# Patient Record
Sex: FEMALE | Race: OTHER | HISPANIC OR LATINO | URBAN - METROPOLITAN AREA
[De-identification: names, ages, dates, MRNs, and addresses within clinical notes are randomized per-mention and may not be internally consistent; named-entity substitution may affect disease eponyms.]

---

## 2017-01-23 ENCOUNTER — EMERGENCY (EMERGENCY)
Facility: HOSPITAL | Age: 47
LOS: 1 days | Discharge: HOME | End: 2017-01-23
Attending: EMERGENCY MEDICINE
Payer: MEDICAID

## 2017-01-23 VITALS
HEART RATE: 76 BPM | DIASTOLIC BLOOD PRESSURE: 77 MMHG | SYSTOLIC BLOOD PRESSURE: 152 MMHG | OXYGEN SATURATION: 100 % | RESPIRATION RATE: 18 BRPM | WEIGHT: 130.07 LBS | HEIGHT: 61 IN | TEMPERATURE: 98 F

## 2017-01-23 DIAGNOSIS — Z98.890 OTHER SPECIFIED POSTPROCEDURAL STATES: ICD-10-CM

## 2017-01-23 DIAGNOSIS — R42 DIZZINESS AND GIDDINESS: ICD-10-CM

## 2017-01-23 DIAGNOSIS — E78.00 PURE HYPERCHOLESTEROLEMIA, UNSPECIFIED: ICD-10-CM

## 2017-01-23 DIAGNOSIS — E05.90 THYROTOXICOSIS, UNSPECIFIED WITHOUT THYROTOXIC CRISIS OR STORM: ICD-10-CM

## 2017-01-23 DIAGNOSIS — Z88.0 ALLERGY STATUS TO PENICILLIN: ICD-10-CM

## 2017-01-23 PROCEDURE — 93010 ELECTROCARDIOGRAM REPORT: CPT

## 2017-01-23 PROCEDURE — 71020: CPT | Mod: 26

## 2017-01-23 PROCEDURE — 99285 EMERGENCY DEPT VISIT HI MDM: CPT

## 2017-01-23 RX ORDER — SODIUM CHLORIDE 9 MG/ML
1000 INJECTION INTRAMUSCULAR; INTRAVENOUS; SUBCUTANEOUS ONCE
Qty: 0 | Refills: 0 | Status: COMPLETED | OUTPATIENT
Start: 2017-01-23 | End: 2017-01-23

## 2017-01-23 RX ORDER — ACETAMINOPHEN 500 MG
975 TABLET ORAL ONCE
Qty: 0 | Refills: 0 | Status: COMPLETED | OUTPATIENT
Start: 2017-01-23 | End: 2017-01-24

## 2017-01-23 RX ORDER — MECLIZINE HCL 12.5 MG
25 TABLET ORAL ONCE
Qty: 0 | Refills: 0 | Status: COMPLETED | OUTPATIENT
Start: 2017-01-23 | End: 2017-01-23

## 2017-01-23 RX ORDER — SODIUM CHLORIDE 9 MG/ML
1000 INJECTION INTRAMUSCULAR; INTRAVENOUS; SUBCUTANEOUS
Qty: 0 | Refills: 0 | Status: DISCONTINUED | OUTPATIENT
Start: 2017-01-23 | End: 2017-01-27

## 2017-01-23 RX ADMIN — SODIUM CHLORIDE 1000 MILLILITER(S): 9 INJECTION INTRAMUSCULAR; INTRAVENOUS; SUBCUTANEOUS at 23:54

## 2017-01-23 NOTE — ED PROVIDER NOTE - NS ED MD SCRIBE ATTENDING SCRIBE SECTIONS
PAST MEDICAL/SURGICAL/SOCIAL HISTORY/REVIEW OF SYSTEMS/DISPOSITION/PHYSICAL EXAM/HIV/HISTORY OF PRESENT ILLNESS/VITAL SIGNS( Pullset)

## 2017-01-23 NOTE — ED PROVIDER NOTE - OBJECTIVE STATEMENT
A 46 year old female pt presents to the ED c/o dizziness. The pt had a sudden onset of diaphoresis, weakness, headache, dizziness, and palpitations s/p standing up from supine position around 8 PM. Pt also reports numbness and tingling in her extremities. She has a hx of HTN and a heart murmur. Pt states that she was experiencing right ear pain and ringing 1 week and was given OTC ear medication. Her PMD is Dr. Salgado. No further complaints at this time.

## 2017-01-23 NOTE — ED ADULT TRIAGE NOTE - CHIEF COMPLAINT QUOTE
pt reports "I was home, I became sweaty, my heart was racing, then I was cold again." at present time reports dizziness and legs shaky.

## 2017-01-23 NOTE — ED PROVIDER NOTE - PROGRESS NOTE DETAILS
symptoms improved.  stable for discharge with meclizine .  h&p suggest vertigo.  informed about tft's.   d/c home

## 2017-01-23 NOTE — ED STATDOCS - PROGRESS NOTE DETAILS
This is a 46 year old female with hx of HTN presenting to the ED complaining of a sudden onset of dizziness, SOB, palpitations, and diaphoresis prior to arrival. She denies abdominal pain, nausea, vomiting, and headaches. Pt is compliant with enalapril. PSHx includes c-sections x 2. LMP 12/23. She denies previous episodes. Allergy to penicillin. Focused exam performed, protocol orders entered, pt to be placed in main ED for complete evaluation by another provider.

## 2017-01-24 VITALS
OXYGEN SATURATION: 100 % | RESPIRATION RATE: 18 BRPM | HEART RATE: 88 BPM | SYSTOLIC BLOOD PRESSURE: 124 MMHG | TEMPERATURE: 98 F | DIASTOLIC BLOOD PRESSURE: 66 MMHG

## 2017-01-24 LAB
ALBUMIN SERPL ELPH-MCNC: 4.3 G/DL — SIGNIFICANT CHANGE UP (ref 3.3–5.2)
ALP SERPL-CCNC: 49 U/L — SIGNIFICANT CHANGE UP (ref 40–120)
ALT FLD-CCNC: 13 U/L — SIGNIFICANT CHANGE UP
ANION GAP SERPL CALC-SCNC: 12 MMOL/L — SIGNIFICANT CHANGE UP (ref 5–17)
APPEARANCE UR: CLEAR — SIGNIFICANT CHANGE UP
AST SERPL-CCNC: 16 U/L — SIGNIFICANT CHANGE UP
BACTERIA # UR AUTO: ABNORMAL
BASOPHILS # BLD AUTO: 0 K/UL — SIGNIFICANT CHANGE UP (ref 0–0.2)
BASOPHILS NFR BLD AUTO: 0.1 % — SIGNIFICANT CHANGE UP (ref 0–2)
BILIRUB SERPL-MCNC: 0.2 MG/DL — LOW (ref 0.4–2)
BILIRUB UR-MCNC: NEGATIVE — SIGNIFICANT CHANGE UP
BUN SERPL-MCNC: 14 MG/DL — SIGNIFICANT CHANGE UP (ref 8–20)
CALCIUM SERPL-MCNC: 9.8 MG/DL — SIGNIFICANT CHANGE UP (ref 8.6–10.2)
CHLORIDE SERPL-SCNC: 99 MMOL/L — SIGNIFICANT CHANGE UP (ref 98–107)
CK SERPL-CCNC: 59 U/L — SIGNIFICANT CHANGE UP (ref 25–170)
CO2 SERPL-SCNC: 25 MMOL/L — SIGNIFICANT CHANGE UP (ref 22–29)
COLOR SPEC: YELLOW — SIGNIFICANT CHANGE UP
CREAT SERPL-MCNC: 0.56 MG/DL — SIGNIFICANT CHANGE UP (ref 0.5–1.3)
DIFF PNL FLD: NEGATIVE — SIGNIFICANT CHANGE UP
EOSINOPHIL # BLD AUTO: 0.1 K/UL — SIGNIFICANT CHANGE UP (ref 0–0.5)
EOSINOPHIL NFR BLD AUTO: 1 % — SIGNIFICANT CHANGE UP (ref 0–6)
EPI CELLS # UR: SIGNIFICANT CHANGE UP
GLUCOSE SERPL-MCNC: 97 MG/DL — SIGNIFICANT CHANGE UP (ref 70–115)
GLUCOSE UR QL: NEGATIVE MG/DL — SIGNIFICANT CHANGE UP
HCG UR QL: NEGATIVE — SIGNIFICANT CHANGE UP
HCT VFR BLD CALC: 38.4 % — SIGNIFICANT CHANGE UP (ref 37–47)
HGB BLD-MCNC: 12.8 G/DL — SIGNIFICANT CHANGE UP (ref 12–16)
KETONES UR-MCNC: NEGATIVE — SIGNIFICANT CHANGE UP
LEUKOCYTE ESTERASE UR-ACNC: NEGATIVE — SIGNIFICANT CHANGE UP
LIDOCAIN IGE QN: 42 U/L — SIGNIFICANT CHANGE UP (ref 22–51)
LYMPHOCYTES # BLD AUTO: 2.4 K/UL — SIGNIFICANT CHANGE UP (ref 1–4.8)
LYMPHOCYTES # BLD AUTO: 25.9 % — SIGNIFICANT CHANGE UP (ref 20–55)
MCHC RBC-ENTMCNC: 30 PG — SIGNIFICANT CHANGE UP (ref 27–31)
MCHC RBC-ENTMCNC: 33.3 G/DL — SIGNIFICANT CHANGE UP (ref 32–36)
MCV RBC AUTO: 89.9 FL — SIGNIFICANT CHANGE UP (ref 81–99)
MONOCYTES # BLD AUTO: 0.7 K/UL — SIGNIFICANT CHANGE UP (ref 0–0.8)
MONOCYTES NFR BLD AUTO: 7.4 % — SIGNIFICANT CHANGE UP (ref 3–10)
NEUTROPHILS # BLD AUTO: 6.1 K/UL — SIGNIFICANT CHANGE UP (ref 1.8–8)
NEUTROPHILS NFR BLD AUTO: 65.4 % — SIGNIFICANT CHANGE UP (ref 37–73)
NITRITE UR-MCNC: NEGATIVE — SIGNIFICANT CHANGE UP
PH UR: 7 — SIGNIFICANT CHANGE UP (ref 4.8–8)
PLATELET # BLD AUTO: 273 K/UL — SIGNIFICANT CHANGE UP (ref 150–400)
POTASSIUM SERPL-MCNC: 3.7 MMOL/L — SIGNIFICANT CHANGE UP (ref 3.5–5.3)
POTASSIUM SERPL-SCNC: 3.7 MMOL/L — SIGNIFICANT CHANGE UP (ref 3.5–5.3)
PROT SERPL-MCNC: 7.7 G/DL — SIGNIFICANT CHANGE UP (ref 6.6–8.7)
PROT UR-MCNC: NEGATIVE MG/DL — SIGNIFICANT CHANGE UP
RBC # BLD: 4.27 M/UL — LOW (ref 4.4–5.2)
RBC # FLD: 12.8 % — SIGNIFICANT CHANGE UP (ref 11–15.6)
RBC CASTS # UR COMP ASSIST: NEGATIVE /HPF — SIGNIFICANT CHANGE UP (ref 0–4)
SODIUM SERPL-SCNC: 136 MMOL/L — SIGNIFICANT CHANGE UP (ref 135–145)
SP GR SPEC: 1.01 — SIGNIFICANT CHANGE UP (ref 1.01–1.02)
T4 AB SER-ACNC: 13.27 UG/DL — HIGH (ref 4.5–12)
TROPONIN T SERPL-MCNC: <0.01 NG/ML — SIGNIFICANT CHANGE UP (ref 0–0.06)
TSH SERPL-MCNC: 0.01 UIU/ML — LOW (ref 0.27–4.2)
UROBILINOGEN FLD QL: NEGATIVE MG/DL — SIGNIFICANT CHANGE UP
WBC # BLD: 9.31 K/UL — SIGNIFICANT CHANGE UP (ref 4.8–10.8)
WBC # FLD AUTO: 9.31 K/UL — SIGNIFICANT CHANGE UP (ref 4.8–10.8)
WBC UR QL: SIGNIFICANT CHANGE UP

## 2017-01-24 PROCEDURE — 85027 COMPLETE CBC AUTOMATED: CPT

## 2017-01-24 PROCEDURE — 71046 X-RAY EXAM CHEST 2 VIEWS: CPT

## 2017-01-24 PROCEDURE — 80053 COMPREHEN METABOLIC PANEL: CPT

## 2017-01-24 PROCEDURE — 96374 THER/PROPH/DIAG INJ IV PUSH: CPT

## 2017-01-24 PROCEDURE — 84443 ASSAY THYROID STIM HORMONE: CPT

## 2017-01-24 PROCEDURE — 93005 ELECTROCARDIOGRAM TRACING: CPT

## 2017-01-24 PROCEDURE — 83690 ASSAY OF LIPASE: CPT

## 2017-01-24 PROCEDURE — 99284 EMERGENCY DEPT VISIT MOD MDM: CPT | Mod: 25

## 2017-01-24 PROCEDURE — 84484 ASSAY OF TROPONIN QUANT: CPT

## 2017-01-24 PROCEDURE — 81001 URINALYSIS AUTO W/SCOPE: CPT

## 2017-01-24 PROCEDURE — 84436 ASSAY OF TOTAL THYROXINE: CPT

## 2017-01-24 PROCEDURE — 81025 URINE PREGNANCY TEST: CPT

## 2017-01-24 PROCEDURE — 82550 ASSAY OF CK (CPK): CPT

## 2017-01-24 RX ORDER — MECLIZINE HCL 12.5 MG
1 TABLET ORAL
Qty: 30 | Refills: 0 | OUTPATIENT
Start: 2017-01-24 | End: 2017-02-03

## 2017-01-24 RX ADMIN — Medication 25 MILLIGRAM(S): at 00:09

## 2017-01-24 RX ADMIN — Medication 975 MILLIGRAM(S): at 00:14

## 2017-01-24 NOTE — ED ADULT NURSE NOTE - OBJECTIVE STATEMENT
Patient A&Ox4, complaining of mild dizziness & minor headache. Denies any vision changes, nausea, or shortness of breath. Stated feels like her heart "is going fast". Cardiac monitor in place, NSR. HR 83. Respirations even & unlabored. Patient stated she was at home watching a movie when she began to feel hot flashes, sweat, dizziness, & heart palpitations. Will continue to monitor.

## 2017-03-04 ENCOUNTER — EMERGENCY (EMERGENCY)
Facility: HOSPITAL | Age: 47
LOS: 1 days | Discharge: HOME | End: 2017-03-04
Attending: EMERGENCY MEDICINE
Payer: MEDICAID

## 2017-03-04 VITALS
DIASTOLIC BLOOD PRESSURE: 75 MMHG | HEIGHT: 62 IN | OXYGEN SATURATION: 100 % | SYSTOLIC BLOOD PRESSURE: 115 MMHG | RESPIRATION RATE: 18 BRPM | WEIGHT: 130.07 LBS | HEART RATE: 74 BPM | TEMPERATURE: 98 F

## 2017-03-04 DIAGNOSIS — Z98.890 OTHER SPECIFIED POSTPROCEDURAL STATES: ICD-10-CM

## 2017-03-04 DIAGNOSIS — Z88.0 ALLERGY STATUS TO PENICILLIN: ICD-10-CM

## 2017-03-04 DIAGNOSIS — M25.511 PAIN IN RIGHT SHOULDER: ICD-10-CM

## 2017-03-04 DIAGNOSIS — M75.51 BURSITIS OF RIGHT SHOULDER: ICD-10-CM

## 2017-03-04 DIAGNOSIS — F41.9 ANXIETY DISORDER, UNSPECIFIED: ICD-10-CM

## 2017-03-04 DIAGNOSIS — E78.00 PURE HYPERCHOLESTEROLEMIA, UNSPECIFIED: ICD-10-CM

## 2017-03-04 PROCEDURE — 73030 X-RAY EXAM OF SHOULDER: CPT | Mod: 26,RT

## 2017-03-04 PROCEDURE — 99283 EMERGENCY DEPT VISIT LOW MDM: CPT | Mod: 25

## 2017-03-04 PROCEDURE — 73030 X-RAY EXAM OF SHOULDER: CPT

## 2017-03-04 RX ORDER — IBUPROFEN 200 MG
600 TABLET ORAL ONCE
Qty: 0 | Refills: 0 | Status: COMPLETED | OUTPATIENT
Start: 2017-03-04 | End: 2017-03-04

## 2017-03-04 RX ADMIN — Medication 600 MILLIGRAM(S): at 07:15

## 2017-03-04 NOTE — ED ADULT NURSE NOTE - OBJECTIVE STATEMENT
pt A&Ox3, c/o right shoulder pain x3 days with pain worsening today, pt denies any trauma, numbness or tingling

## 2017-03-04 NOTE — ED PROVIDER NOTE - OBJECTIVE STATEMENT
46F presents to the ED c/o right shoulder pain x 2-3 days. Pt states that she doesn't remember any specific activity that caused the pain and denies heavy lifting. Pt also denies cp, sob, abdominal pain, numbness/tingling and has no other complaints.

## 2017-03-04 NOTE — ED PROCEDURE NOTE - CPROC ED POST PROC CARE GUIDE1
Elevate the injured extremity as instructed./Verbal/written post procedure instructions were given to patient/caregiver./Instructed patient/caregiver regarding signs and symptoms of infection./Instructed patient/caregiver to follow-up with primary care physician./Keep the cast/splint/dressing clean and dry.

## 2017-03-04 NOTE — ED PROVIDER NOTE - PHYSICAL EXAMINATION
Musculoskeletal: + tenderness with palpation of the anterior right shoulder, decreased active/passive ROM due to reported pain, 2+ radial pulse, no deformities palpated

## 2017-03-04 NOTE — ED PROCEDURE NOTE - NS ED PERI VASCULAR NEG
capillary refill time < 2 seconds/no swelling/no paresthesia/fingers/toes warm to touch/no cyanosis of extremity

## 2017-03-04 NOTE — ED PROVIDER NOTE - ATTENDING CONTRIBUTION TO CARE
Pa care supervised by me and I examined patient who c/o rt shoulder pain for 4 days PE tender over ant shoulder neuro vasc intact distally c/w bursitis no erythema not hot xray nl will tx sx and rec sling with rom exercises and close f/u ortho and pt knows to return if sx worsen

## 2017-03-04 NOTE — ED PROVIDER NOTE - PROGRESS NOTE DETAILS
Pts xrays negative, likely bursitis. Given sling for comfort and instructed to remove arm from sling and perform ROM exercises 3 times a day to avoid frozen shoulder. Pt stable for d/c with ortho f/u.

## 2017-03-06 ENCOUNTER — APPOINTMENT (OUTPATIENT)
Dept: ORTHOPEDIC SURGERY | Facility: CLINIC | Age: 47
End: 2017-03-06

## 2017-03-06 VITALS
HEART RATE: 68 BPM | DIASTOLIC BLOOD PRESSURE: 92 MMHG | SYSTOLIC BLOOD PRESSURE: 140 MMHG | HEIGHT: 61 IN | WEIGHT: 130 LBS | BODY MASS INDEX: 24.55 KG/M2

## 2017-03-06 DIAGNOSIS — Z78.9 OTHER SPECIFIED HEALTH STATUS: ICD-10-CM

## 2017-03-06 DIAGNOSIS — Z86.79 PERSONAL HISTORY OF OTHER DISEASES OF THE CIRCULATORY SYSTEM: ICD-10-CM

## 2017-03-06 DIAGNOSIS — M75.01 ADHESIVE CAPSULITIS OF RIGHT SHOULDER: ICD-10-CM

## 2017-06-15 ENCOUNTER — TRANSCRIPTION ENCOUNTER (OUTPATIENT)
Age: 47
End: 2017-06-15

## 2017-06-16 ENCOUNTER — TRANSCRIPTION ENCOUNTER (OUTPATIENT)
Age: 47
End: 2017-06-16

## 2017-07-11 NOTE — ED ADULT TRIAGE NOTE - NS ED NURSE BANDS TYPE
Gastroesophageal Reflux Disease (GERD): Care Instructions  Your Care Instructions    Gastroesophageal reflux disease (GERD) is the backward flow of stomach acid into the esophagus. The esophagus is the tube that leads from your throat to your stomach. A one-way valve prevents the stomach acid from moving up into this tube. When you have GERD, this valve does not close tightly enough. If you have mild GERD symptoms including heartburn, you may be able to control the problem with antacids or over-the-counter medicine. Changing your diet, losing weight, and making other lifestyle changes can also help reduce symptoms. Follow-up care is a key part of your treatment and safety. Be sure to make and go to all appointments, and call your doctor if you are having problems. Its also a good idea to know your test results and keep a list of the medicines you take. How can you care for yourself at home? · Take your medicines exactly as prescribed. Call your doctor if you think you are having a problem with your medicine. · Your doctor may recommend over-the-counter medicine. For mild or occasional indigestion, antacids, such as Tums, Gaviscon, Mylanta, or Maalox, may help. Your doctor also may recommend over-the-counter acid reducers, such as Pepcid AC, Tagamet HB, Zantac 75, or Prilosec. Read and follow all instructions on the label. If you use these medicines often, talk with your doctor. · Change your eating habits. ¨ Its best to eat several small meals instead of two or three large meals. ¨ After you eat, wait 2 to 3 hours before you lie down. ¨ Chocolate, mint, and alcohol can make GERD worse. ¨ Spicy foods, foods that have a lot of acid (like tomatoes and oranges), and coffee can make GERD symptoms worse in some people. If your symptoms are worse after you eat a certain food, you may want to stop eating that food to see if your symptoms get better.   · Do not smoke or chew tobacco. Smoking can make GERD worse. If you need help quitting, talk to your doctor about stop-smoking programs and medicines. These can increase your chances of quitting for good. · If you have GERD symptoms at night, raise the head of your bed 6 to 8 inches by putting the frame on blocks or placing a foam wedge under the head of your mattress. (Adding extra pillows does not work.)  · Do not wear tight clothing around your middle. · Lose weight if you need to. Losing just 5 to 10 pounds can help. When should you call for help? Call your doctor now or seek immediate medical care if:  · You have new or different belly pain. · Your stools are black and tarlike or have streaks of blood. Watch closely for changes in your health, and be sure to contact your doctor if:  · Your symptoms have not improved after 2 days. · Food seems to catch in your throat or chest.  Where can you learn more? Go to http://[employer]christy.info/. Enter Y366 in the search box to learn more about \"Gastroesophageal Reflux Disease (GERD): Care Instructions. \"  Current as of: August 9, 2016  Content Version: 11.3  © 8966-8497 [employer]. Care instructions adapted under license by [employer] (which disclaims liability or warranty for this information). If you have questions about a medical condition or this instruction, always ask your healthcare professional. [address] any warranty or liability for your use of this information. We hope that we have addressed all of your medical concerns. The examination and treatment you received in the Emergency Department were for an emergent problem and were not intended as complete care. It is important that you follow up with your healthcare provider(s) for ongoing care. If your symptoms worsen or do not improve as expected, and you are unable to reach your usual health care provider(s), you should return to the Emergency Department.       Today's healthcare is undergoing tremendous change, and patient satisfaction surveys are one of the many tools to assess the quality of medical care. You may receive a survey from the [employer] regarding your experience in the Emergency Department. I hope that your experience has been completely positive, particularly the medical care that I provided. As such, please participate in the survey; anything less than excellent does not meet my expectations or intentions. [facility] and [facility] participate in nationally recognized quality of care measures. If your blood pressure is greater than 120/80, as reported below, we urge that you seek medical care to address the potential of high blood pressure, commonly known as hypertension. Hypertension can be hereditary or can be caused by certain medical conditions, pain, stress, or \"white coat syndrome. \"       Please make an appointment with your health care provider(s) for follow up of your Emergency Department visit. VITALS:   Patient Vitals for the past 8 hrs:   Temp Pulse Resp BP SpO2   07/11/17 1615 - - - - 97 %   07/11/17 1600 - - - 116/75 99 %   07/11/17 1545 - - - - 100 %   07/11/17 1530 - - - 110/57 99 %   07/11/17 1500 - - - 122/77 99 %   07/11/17 1445 - - - - 99 %   07/11/17 1430 - - - 114/79 99 %   07/11/17 1119 98.1 °F (36.7 °C) 72 16 126/79 96 %          Thank you for allowing us to provide you with medical care today. We realize that you have many choices for your emergency care needs. Please choose us in the future for any continued health care needs. Raleigh Wills  [address]   Office: [phone]            Recent Results (from the past 24 hour(s))   EKG, 12 LEAD, INITIAL    Collection Time: 07/11/17 11:00 AM   Result Value Ref Range    Ventricular Rate 65 BPM    Atrial Rate 65 BPM    P-R Interval 156 ms    QRS Duration 80 ms Q-T Interval 414 ms    QTC Calculation (Bezet) 430 ms    Calculated P Axis 67 degrees    Calculated R Axis 29 degrees    Calculated T Axis 27 degrees    Diagnosis       Normal sinus rhythm  When compared with ECG of 26-APR-2017 21:45,  No significant change was found  Confirmed by Funmilayo Talley M.D., Peggy Ramires ([id number]) on 7/11/2017 3:44:37 PM     CBC WITH AUTOMATED DIFF    Collection Time: 07/11/17 12:13 PM   Result Value Ref Range    WBC 8.0 3.6 - 11.0 K/uL    RBC 5.82 (H) 3.80 - 5.20 M/uL    HGB 16.9 (H) 11.5 - 16.0 g/dL    HCT 49.5 (H) 35.0 - 47.0 %    MCV 85.1 80.0 - 99.0 FL    MCH 29.0 26.0 - 34.0 PG    MCHC 34.1 30.0 - 36.5 g/dL    RDW 13.7 11.5 - 14.5 %    PLATELET 467 460 - 119 K/uL    NEUTROPHILS 63 32 - 75 %    LYMPHOCYTES 31 12 - 49 %    MONOCYTES 6 5 - 13 %    EOSINOPHILS 0 0 - 7 %    BASOPHILS 0 0 - 1 %    ABS. NEUTROPHILS 5.0 1.8 - 8.0 K/UL    ABS. LYMPHOCYTES 2.5 0.8 - 3.5 K/UL    ABS. MONOCYTES 0.5 0.0 - 1.0 K/UL    ABS. EOSINOPHILS 0.0 0.0 - 0.4 K/UL    ABS. BASOPHILS 0.0 0.0 - 0.1 K/UL   METABOLIC PANEL, COMPREHENSIVE    Collection Time: 07/11/17 12:13 PM   Result Value Ref Range    Sodium 135 (L) 136 - 145 mmol/L    Potassium 3.9 3.5 - 5.1 mmol/L    Chloride 98 97 - 108 mmol/L    CO2 31 21 - 32 mmol/L    Anion gap 6 5 - 15 mmol/L    Glucose 274 (H) 65 - 100 mg/dL    BUN 17 6 - 20 MG/DL    Creatinine 0.76 0.55 - 1.02 MG/DL    BUN/Creatinine ratio 22 (H) 12 - 20      GFR est AA >60 >60 ml/min/1.73m2    GFR est non-AA >60 >60 ml/min/1.73m2    Calcium 9.8 8.5 - 10.1 MG/DL    Bilirubin, total 0.4 0.2 - 1.0 MG/DL    ALT (SGPT) 26 12 - 78 U/L    AST (SGOT) 13 (L) 15 - 37 U/L    Alk.  phosphatase 85 45 - 117 U/L    Protein, total 8.4 (H) 6.4 - 8.2 g/dL    Albumin 4.1 3.5 - 5.0 g/dL    Globulin 4.3 (H) 2.0 - 4.0 g/dL    A-G Ratio 1.0 (L) 1.1 - 2.2     TROPONIN I    Collection Time: 07/11/17 12:13 PM   Result Value Ref Range    Troponin-I, Qt. <0.04 <0.05 ng/mL   CK W/ CKMB & INDEX    Collection Time: 07/11/17 12:13 PM Name band; Result Value Ref Range    CK 45 26 - 192 U/L    CK - MB <1.0 <3.6 NG/ML    CK-MB Index Cannot be calulated 0 - 2.5     LIPASE    Collection Time: 07/11/17 12:13 PM   Result Value Ref Range    Lipase 198 73 - 393 U/L   D DIMER    Collection Time: 07/11/17 12:13 PM   Result Value Ref Range    D-dimer <0.17 0.00 - 0.65 mg/L FEU   POC TROPONIN-I    Collection Time: 07/11/17  4:13 PM   Result Value Ref Range    Troponin-I (POC) <0.04 0.00 - 0.08 ng/mL       Xr Chest Pa Lat    Result Date: 7/11/2017  Indication:  cp Exam: PA and lateral views of the chest. Direct comparison is made to prior CXR dated 3/2016. Findings: Cardiomediastinal silhouette is within normal limits. Lungs are clear bilaterally. Pleural spaces are normal. Osseous structures are intact. IMPRESSION: No acute cardiopulmonary disease.

## 2018-03-14 ENCOUNTER — EMERGENCY (EMERGENCY)
Facility: HOSPITAL | Age: 48
LOS: 1 days | Discharge: HOME | End: 2018-03-14
Attending: EMERGENCY MEDICINE | Admitting: EMERGENCY MEDICINE
Payer: MEDICAID

## 2018-03-14 VITALS
HEART RATE: 66 BPM | TEMPERATURE: 98 F | OXYGEN SATURATION: 96 % | RESPIRATION RATE: 20 BRPM | SYSTOLIC BLOOD PRESSURE: 126 MMHG | DIASTOLIC BLOOD PRESSURE: 77 MMHG

## 2018-03-14 VITALS
HEART RATE: 86 BPM | HEIGHT: 65 IN | DIASTOLIC BLOOD PRESSURE: 78 MMHG | TEMPERATURE: 98 F | RESPIRATION RATE: 18 BRPM | SYSTOLIC BLOOD PRESSURE: 136 MMHG | OXYGEN SATURATION: 99 % | WEIGHT: 175.05 LBS

## 2018-03-14 PROCEDURE — 73590 X-RAY EXAM OF LOWER LEG: CPT | Mod: 26,RT

## 2018-03-14 PROCEDURE — 99284 EMERGENCY DEPT VISIT MOD MDM: CPT | Mod: 25

## 2018-03-14 PROCEDURE — 29515 APPLICATION SHORT LEG SPLINT: CPT

## 2018-03-14 PROCEDURE — 73630 X-RAY EXAM OF FOOT: CPT

## 2018-03-14 PROCEDURE — 73590 X-RAY EXAM OF LOWER LEG: CPT

## 2018-03-14 PROCEDURE — 96372 THER/PROPH/DIAG INJ SC/IM: CPT | Mod: XU

## 2018-03-14 PROCEDURE — 29515 APPLICATION SHORT LEG SPLINT: CPT | Mod: RT

## 2018-03-14 PROCEDURE — 73630 X-RAY EXAM OF FOOT: CPT | Mod: 26,RT

## 2018-03-14 RX ORDER — KETOROLAC TROMETHAMINE 30 MG/ML
60 SYRINGE (ML) INJECTION ONCE
Qty: 0 | Refills: 0 | Status: DISCONTINUED | OUTPATIENT
Start: 2018-03-14 | End: 2018-03-14

## 2018-03-14 RX ADMIN — Medication 60 MILLIGRAM(S): at 14:55

## 2018-03-14 NOTE — ED ADULT TRIAGE NOTE - CHIEF COMPLAINT QUOTE
pt alert and awake x3, BIBA s/p slip and fall on ice, c/o lower right leg pain, obvious deformity to right leg, positive pulses.

## 2018-03-14 NOTE — ED PROVIDER NOTE - OBJECTIVE STATEMENT
48 YO FEMALE WITH C/O FALLING. PT STATES SHE SLIPPED ON THE ICE AND HEARD A SNAP IN HER ANKLE. STATES SHE WAS UNABLE TO BEAR WEIGHT AFTER. NO NUMBNESS OR TINGLING. + DECREASED ROM.  MED HX Anxiety    Chronic neck pain    Heart murmur    High cholesterol

## 2018-03-14 NOTE — ED PROVIDER NOTE - MEDICAL DECISION MAKING DETAILS
DISTAL TIB/FIB PAIN TO EXAM, SWELLING. + NON DISPLACED DISTAL FIB FX DISTAL TIB/FIB PAIN TO EXAM, SWELLING. + NON DISPLACED DISTAL FIB FX  SPLINT AND ORTHO FOLLOW UP

## 2018-03-14 NOTE — ED ADULT NURSE NOTE - OBJECTIVE STATEMENT
pt with reports of right ankle pain and swelling s.p slip and fall on ice. no head injury, no anticoags, AOX3, even and unlabored resps present. pedal pulses present and equal bilat. skin warm dry and intact.

## 2018-04-22 ENCOUNTER — EMERGENCY (EMERGENCY)
Facility: HOSPITAL | Age: 48
LOS: 1 days | Discharge: HOME | End: 2018-04-22
Attending: EMERGENCY MEDICINE
Payer: MEDICAID

## 2018-04-22 VITALS
SYSTOLIC BLOOD PRESSURE: 127 MMHG | RESPIRATION RATE: 20 BRPM | TEMPERATURE: 98 F | HEART RATE: 83 BPM | DIASTOLIC BLOOD PRESSURE: 79 MMHG | OXYGEN SATURATION: 100 %

## 2018-04-22 VITALS — WEIGHT: 134.92 LBS | HEIGHT: 62 IN

## 2018-04-22 PROCEDURE — 99284 EMERGENCY DEPT VISIT MOD MDM: CPT

## 2018-04-22 PROCEDURE — 99283 EMERGENCY DEPT VISIT LOW MDM: CPT

## 2018-04-22 RX ORDER — AZITHROMYCIN 500 MG/1
500 TABLET, FILM COATED ORAL ONCE
Qty: 0 | Refills: 0 | Status: COMPLETED | OUTPATIENT
Start: 2018-04-22 | End: 2018-04-22

## 2018-04-22 RX ORDER — IBUPROFEN 200 MG
1 TABLET ORAL
Qty: 20 | Refills: 0 | OUTPATIENT
Start: 2018-04-22 | End: 2018-04-26

## 2018-04-22 RX ORDER — DIPHENHYDRAMINE HCL 50 MG
1 CAPSULE ORAL
Qty: 10 | Refills: 0 | OUTPATIENT
Start: 2018-04-22 | End: 2018-04-26

## 2018-04-22 RX ORDER — DIPHENHYDRAMINE HCL 50 MG
25 CAPSULE ORAL ONCE
Qty: 0 | Refills: 0 | Status: COMPLETED | OUTPATIENT
Start: 2018-04-22 | End: 2018-04-22

## 2018-04-22 RX ORDER — AZITHROMYCIN 500 MG/1
1 TABLET, FILM COATED ORAL
Qty: 1 | Refills: 0 | OUTPATIENT
Start: 2018-04-22 | End: 2018-04-26

## 2018-04-22 RX ADMIN — AZITHROMYCIN 500 MILLIGRAM(S): 500 TABLET, FILM COATED ORAL at 21:12

## 2018-04-22 RX ADMIN — Medication 25 MILLIGRAM(S): at 21:11

## 2018-04-22 NOTE — ED PROVIDER NOTE - MEDICAL DECISION MAKING DETAILS
Will give rx for Benadryl, Azithromycin, Ibuprofen, follow-up with ENT. Pt educated about diagnosis, treatment, and follow up. Pt verbalizes that they understand instructions.

## 2018-04-22 NOTE — ED PROVIDER NOTE - OBJECTIVE STATEMENT
A 47 year old female pt with a hx of HTN, heart murmur, presents to the ED c/o sore throat. For the past 4 days the pt has been dealing with persistent sore throat, headache, ear pain, and dizziness, feeling as though the room is spinning. Her PMD is Dr. Marga Salgado whom she saw for this issue and was given Meclozine but states that it made her dizzier. She also reports a fever of 102 F at home and took Tylenol earlier today PTA. No further complaints at this time.

## 2019-02-06 ENCOUNTER — EMERGENCY (EMERGENCY)
Facility: HOSPITAL | Age: 49
LOS: 1 days | Discharge: HOME | End: 2019-02-06
Attending: EMERGENCY MEDICINE
Payer: MEDICAID

## 2019-02-06 VITALS
SYSTOLIC BLOOD PRESSURE: 120 MMHG | RESPIRATION RATE: 18 BRPM | HEART RATE: 82 BPM | DIASTOLIC BLOOD PRESSURE: 71 MMHG | OXYGEN SATURATION: 100 %

## 2019-02-06 VITALS
OXYGEN SATURATION: 100 % | DIASTOLIC BLOOD PRESSURE: 88 MMHG | WEIGHT: 130.07 LBS | SYSTOLIC BLOOD PRESSURE: 158 MMHG | RESPIRATION RATE: 18 BRPM | HEIGHT: 62 IN | TEMPERATURE: 98 F | HEART RATE: 95 BPM

## 2019-02-06 LAB
ALBUMIN SERPL ELPH-MCNC: 4.1 G/DL — SIGNIFICANT CHANGE UP (ref 3.3–5.2)
ALP SERPL-CCNC: 55 U/L — SIGNIFICANT CHANGE UP (ref 40–120)
ALT FLD-CCNC: 9 U/L — SIGNIFICANT CHANGE UP
ANION GAP SERPL CALC-SCNC: 12 MMOL/L — SIGNIFICANT CHANGE UP (ref 5–17)
AST SERPL-CCNC: 16 U/L — SIGNIFICANT CHANGE UP
BASOPHILS # BLD AUTO: 0 K/UL — SIGNIFICANT CHANGE UP (ref 0–0.2)
BASOPHILS NFR BLD AUTO: 0.2 % — SIGNIFICANT CHANGE UP (ref 0–2)
BILIRUB SERPL-MCNC: 0.3 MG/DL — LOW (ref 0.4–2)
BUN SERPL-MCNC: 15 MG/DL — SIGNIFICANT CHANGE UP (ref 8–20)
CALCIUM SERPL-MCNC: 9.3 MG/DL — SIGNIFICANT CHANGE UP (ref 8.6–10.2)
CHLORIDE SERPL-SCNC: 100 MMOL/L — SIGNIFICANT CHANGE UP (ref 98–107)
CO2 SERPL-SCNC: 25 MMOL/L — SIGNIFICANT CHANGE UP (ref 22–29)
CREAT SERPL-MCNC: 0.61 MG/DL — SIGNIFICANT CHANGE UP (ref 0.5–1.3)
EOSINOPHIL # BLD AUTO: 0 K/UL — SIGNIFICANT CHANGE UP (ref 0–0.5)
EOSINOPHIL NFR BLD AUTO: 0 % — SIGNIFICANT CHANGE UP (ref 0–6)
FLUAV H1 2009 PAND RNA SPEC QL NAA+PROBE: DETECTED
GLUCOSE SERPL-MCNC: 99 MG/DL — SIGNIFICANT CHANGE UP (ref 70–115)
HCT VFR BLD CALC: 36.8 % — LOW (ref 37–47)
HGB BLD-MCNC: 12.3 G/DL — SIGNIFICANT CHANGE UP (ref 12–16)
LYMPHOCYTES # BLD AUTO: 0.7 K/UL — LOW (ref 1–4.8)
LYMPHOCYTES # BLD AUTO: 14.9 % — LOW (ref 20–55)
MCHC RBC-ENTMCNC: 30 PG — SIGNIFICANT CHANGE UP (ref 27–31)
MCHC RBC-ENTMCNC: 33.4 G/DL — SIGNIFICANT CHANGE UP (ref 32–36)
MCV RBC AUTO: 89.8 FL — SIGNIFICANT CHANGE UP (ref 81–99)
MONOCYTES # BLD AUTO: 0.4 K/UL — SIGNIFICANT CHANGE UP (ref 0–0.8)
MONOCYTES NFR BLD AUTO: 9.9 % — SIGNIFICANT CHANGE UP (ref 3–10)
NEUTROPHILS # BLD AUTO: 3.3 K/UL — SIGNIFICANT CHANGE UP (ref 1.8–8)
NEUTROPHILS NFR BLD AUTO: 75 % — HIGH (ref 37–73)
PLATELET # BLD AUTO: 196 K/UL — SIGNIFICANT CHANGE UP (ref 150–400)
POTASSIUM SERPL-MCNC: 4.2 MMOL/L — SIGNIFICANT CHANGE UP (ref 3.5–5.3)
POTASSIUM SERPL-SCNC: 4.2 MMOL/L — SIGNIFICANT CHANGE UP (ref 3.5–5.3)
PROT SERPL-MCNC: 8.2 G/DL — SIGNIFICANT CHANGE UP (ref 6.6–8.7)
RAPID RVP RESULT: DETECTED
RBC # BLD: 4.1 M/UL — LOW (ref 4.4–5.2)
RBC # FLD: 13.6 % — SIGNIFICANT CHANGE UP (ref 11–15.6)
SODIUM SERPL-SCNC: 137 MMOL/L — SIGNIFICANT CHANGE UP (ref 135–145)
TROPONIN T SERPL-MCNC: <0.01 NG/ML — SIGNIFICANT CHANGE UP (ref 0–0.06)
TROPONIN T SERPL-MCNC: <0.01 NG/ML — SIGNIFICANT CHANGE UP (ref 0–0.06)
WBC # BLD: 4.4 K/UL — LOW (ref 4.8–10.8)
WBC # FLD AUTO: 4.4 K/UL — LOW (ref 4.8–10.8)

## 2019-02-06 PROCEDURE — 99285 EMERGENCY DEPT VISIT HI MDM: CPT

## 2019-02-06 PROCEDURE — 36415 COLL VENOUS BLD VENIPUNCTURE: CPT

## 2019-02-06 PROCEDURE — 84484 ASSAY OF TROPONIN QUANT: CPT

## 2019-02-06 PROCEDURE — 99284 EMERGENCY DEPT VISIT MOD MDM: CPT | Mod: 25

## 2019-02-06 PROCEDURE — 85027 COMPLETE CBC AUTOMATED: CPT

## 2019-02-06 PROCEDURE — 87798 DETECT AGENT NOS DNA AMP: CPT

## 2019-02-06 PROCEDURE — 71046 X-RAY EXAM CHEST 2 VIEWS: CPT | Mod: 26

## 2019-02-06 PROCEDURE — 93010 ELECTROCARDIOGRAM REPORT: CPT

## 2019-02-06 PROCEDURE — 87486 CHLMYD PNEUM DNA AMP PROBE: CPT

## 2019-02-06 PROCEDURE — 87633 RESP VIRUS 12-25 TARGETS: CPT

## 2019-02-06 PROCEDURE — 87581 M.PNEUMON DNA AMP PROBE: CPT

## 2019-02-06 PROCEDURE — 93005 ELECTROCARDIOGRAM TRACING: CPT

## 2019-02-06 PROCEDURE — 71046 X-RAY EXAM CHEST 2 VIEWS: CPT

## 2019-02-06 PROCEDURE — 80053 COMPREHEN METABOLIC PANEL: CPT

## 2019-02-06 RX ORDER — IBUPROFEN 200 MG
1 TABLET ORAL
Qty: 15 | Refills: 0 | OUTPATIENT
Start: 2019-02-06 | End: 2019-02-10

## 2019-02-06 RX ORDER — ASPIRIN/CALCIUM CARB/MAGNESIUM 324 MG
324 TABLET ORAL ONCE
Qty: 0 | Refills: 0 | Status: COMPLETED | OUTPATIENT
Start: 2019-02-06 | End: 2019-02-06

## 2019-02-06 RX ADMIN — Medication 324 MILLIGRAM(S): at 15:44

## 2019-02-06 RX ADMIN — Medication 75 MILLIGRAM(S): at 18:25

## 2019-02-06 NOTE — ED ADULT NURSE NOTE - ISOLATION TYPE:
-Alimentum 24 kcal  change to continuous feeds   - Daily CMP, Mg, PO4  - famotidine   - Zofran, Vistaril, Lorazepam ATC  - Start emend None

## 2019-02-06 NOTE — ED POST DISCHARGE NOTE - ADDITIONAL DOCUMENTATION
2/7/19 0730:  I s/w patient she is aware of flu A +, will f/u with PCP.  Certified letter cancelled.

## 2019-02-06 NOTE — ED STATDOCS - PROGRESS NOTE DETAILS
47 y/o F with PMHx of heart murmur, HTN and HLD presents to the ED c/o neck pain, back pain, left arm numbness, nonproductive cough and chest discomfort, onset 0630 today. Pt reports having a fever (Tmax:103) and taking Tylenol last night and today with no relief of symptoms; last dose noon today. She states that the back pain radiates down her arm and to her chest. Patient notes chest pain is exacerbated by movement and nothing relieves symptoms.  Pt notes having chest pain before. Denies hand trauma. Denies having stress test in the past. Denies nausea, vomiting, diarrhea, dysuria. Denies getting flu vaccine. Nonsmoker. No further complaints at this time. 47 y/o F with PMHx of heart murmur, HTN and HLD presents to the ED c/o neck pain, back pain, left arm numbness, nonproductive cough and chest discomfort, onset 0630 today. Pt reports having a fever (Tmax:103) and taking Tylenol last night and today with no relief of symptoms; last dose noon today. She states that the back pain radiates down her arm and to her chest. Patient notes chest pain is exacerbated by movement with no relieving factors.  Pt notes having similar chest pain in the past. Denies hand trauma. Denies having stress test in the past. Denies nausea, vomiting, diarrhea, dysuria. Denies getting flu vaccine. Nonsmoker. No further complaints at this time.

## 2019-02-06 NOTE — ED PROVIDER NOTE - PROGRESS NOTE DETAILS
Pt is stable. VS reviewed, elevated BP noted. F/u labs. CXR reviewed, no lung disease. s/p ASA. Will reassess. Pt is stable. VS reviewed. BP stable. Troponin neg X1. F/u repeat troponin. The patient had two trop negative and CXR normal. The patient feels better and appears well.  Will DC home and follow up with PMD

## 2019-02-06 NOTE — ED PROVIDER NOTE - OBJECTIVE STATEMENT
Pt is 49 YO M w/ PMHx of HTN, hypothyroidism and heart murmur (since childhood) presents w/ chest pain since this am while making breakfast. Chest pain is left sided, sharp, 10/10, intermittent, radiates to back and arm, w/ associated palpitations, alleviates with rest and worse with movement, no related to respiration or food. No prior episodes, no FMHx of heart disease, no h/o stress test or cath. Pt reports having fever, body ache, nonproductive cough X 2 days. Her daughter recently had the flu.   Denies fever, chills, n/v/d/c, sob, abdominal pain, numbness or tingling, urinary or bowel changes, burning with urination, blood in urine or stool, calf tenderness Pt is 49 YO M w/ PMHx of HTN, hypothyroidism and heart murmur (since childhood) presents w/ chest pain since this am while making breakfast. Chest pain is left sided, sharp, 10/10, intermittent, radiates to back and arm, w/ associated palpitations, alleviates with rest and worse with movement, not related to respiration or food. No prior episodes, no FMHx of heart disease, no h/o stress test or cath. Pt reports having fever, body ache, nonproductive cough X 2 days. Her daughter recently had the flu.   Denies fever, chills, n/v/d/c, sob, abdominal pain, numbness or tingling, urinary or bowel changes, burning with urination, blood in urine or stool, calf tenderness Pt is 49 YO M w/ PMHx of HTN, hypothyroidism and heart murmur (since childhood) presents w/ chest pain since this am while making breakfast. Chest pain is left sided, sharp, 10/10, intermittent, radiates to back and arm, w/ associated palpitations, alleviates with rest and worse with movement, not related to respiration or food. No prior episodes, no FMHx of heart disease, no h/o stress test or cath. Pt reports having fever, body ache, nonproductive cough X 2 days. Her daughter recently had the flu.   Denies fever, chills, n/v/d/c, sob, abdominal pain, numbness or tingling, urinary or bowel changes, burning with urination, blood in urine or stool, calf tenderness  PMD: Dr. Marga Howard Pt is 49 YO M w/ PMHx of HTN, hypothyroidism and heart murmur (since childhood) presents w/ chest pain since this am while making breakfast. Chest pain is left sided, sharp, 5/10, intermittent, non-radiating, worse with cough, alleviates with rest. Pt reports having fever, body ache, nonproductive cough X 3 days. Her daughter recently had the flu.   Denies fever, chills, n/v/d/c, sob, abdominal pain, numbness or tingling, urinary or bowel changes, burning with urination, blood in urine or stool, calf tenderness  PMD: Dr. Marga Howard

## 2019-02-06 NOTE — ED PROVIDER NOTE - MEDICAL DECISION MAKING DETAILS
47 YO F w/ PMHx of HTN, HLD and heart murmur, c/o left sided CP radiating to back and left arm. Fever, body ache and chills X2 days, recent sick contact. R/o ACS, Viral infection 49 YO F w/ PMHx of HTN, HLD and heart murmur, c/o left sided CP radiating to back and left arm. Fever, body ache and chills X2 days, recent sick contact. R/o ACS, Viral infection likely the flu 49 YO F w/ PMHx of HTN, HLD and heart murmur, c/o left sided CP radiating to back and left arm. Fever, body ache and chills X2 days, recent sick contact. URI r/o the flu

## 2019-02-06 NOTE — ED PROVIDER NOTE - PHYSICAL EXAMINATION
Vital Signs Last 24 Hrs  T(C): 36.7 (06 Feb 2019 14:03), Max: 36.7 (06 Feb 2019 14:03)  T(F): 98.1 (06 Feb 2019 14:03), Max: 98.1 (06 Feb 2019 14:03)  HR: 95 (06 Feb 2019 14:03) (95 - 95)  BP: 158/88 (06 Feb 2019 14:03) (158/88 - 158/88)  RR: 18 (06 Feb 2019 14:03) (18 - 18)  SpO2: 100% (06 Feb 2019 14:03) (100% - 100%)    Physical Examination  General: AAO X3, NAD, well appearing, good hygiene   HEENT: NC/AT, normal oral mucosa, normal tonsils, PERRLA   Resp: CTA b/l, no wheezing or crackles  CVS: Normal S1/S2, no murmur, gallops or rubs  GI: +BS, soft, NT, ND, no guarding, rebound or CVA tenderness  Extremities: +2 DP pulses b/l, no edema, cyanosis or calf tenderness   Psych: Normal affect and mood  Skin: Warm, dry, no rashes   Neuro: AAO X3, no focal deficits, no motor or sensory deficits, gait normal

## 2019-02-06 NOTE — ED PROVIDER NOTE - NS ED ROS FT
Denies fever, chills, n/v/d/c, sob, abdominal pain, numbness or tingling, urinary or bowel changes, burning with urination, blood in urine or stool, calf tenderness

## 2019-02-15 ENCOUNTER — EMERGENCY (EMERGENCY)
Facility: HOSPITAL | Age: 49
LOS: 1 days | Discharge: HOME | End: 2019-02-15
Attending: EMERGENCY MEDICINE
Payer: MEDICAID

## 2019-02-15 VITALS — HEIGHT: 62 IN | WEIGHT: 130.07 LBS

## 2019-02-15 VITALS
RESPIRATION RATE: 18 BRPM | OXYGEN SATURATION: 100 % | TEMPERATURE: 98 F | DIASTOLIC BLOOD PRESSURE: 77 MMHG | HEART RATE: 78 BPM | SYSTOLIC BLOOD PRESSURE: 145 MMHG

## 2019-02-15 PROCEDURE — 99283 EMERGENCY DEPT VISIT LOW MDM: CPT | Mod: 25

## 2019-02-15 PROCEDURE — 99283 EMERGENCY DEPT VISIT LOW MDM: CPT

## 2019-02-15 PROCEDURE — 96372 THER/PROPH/DIAG INJ SC/IM: CPT

## 2019-02-15 RX ORDER — METHOCARBAMOL 500 MG/1
1 TABLET, FILM COATED ORAL
Qty: 10 | Refills: 0 | OUTPATIENT
Start: 2019-02-15 | End: 2019-02-19

## 2019-02-15 RX ORDER — LIDOCAINE 4 G/100G
2 CREAM TOPICAL ONCE
Qty: 0 | Refills: 0 | Status: COMPLETED | OUTPATIENT
Start: 2019-02-15 | End: 2019-02-15

## 2019-02-15 RX ORDER — KETOROLAC TROMETHAMINE 30 MG/ML
30 SYRINGE (ML) INJECTION ONCE
Qty: 0 | Refills: 0 | Status: DISCONTINUED | OUTPATIENT
Start: 2019-02-15 | End: 2019-02-15

## 2019-02-15 RX ORDER — LIDOCAINE 4 G/100G
1 CREAM TOPICAL
Qty: 7 | Refills: 0 | OUTPATIENT
Start: 2019-02-15

## 2019-02-15 RX ORDER — METHOCARBAMOL 500 MG/1
500 TABLET, FILM COATED ORAL ONCE
Qty: 0 | Refills: 0 | Status: COMPLETED | OUTPATIENT
Start: 2019-02-15 | End: 2019-02-15

## 2019-02-15 RX ADMIN — LIDOCAINE 2 PATCH: 4 CREAM TOPICAL at 11:57

## 2019-02-15 RX ADMIN — Medication 30 MILLIGRAM(S): at 11:57

## 2019-02-15 RX ADMIN — METHOCARBAMOL 500 MILLIGRAM(S): 500 TABLET, FILM COATED ORAL at 11:57

## 2019-02-15 NOTE — ED STATDOCS - PHYSICAL EXAMINATION
VITAL SIGNS: I have reviewed nursing notes and confirm.  CONSTITUTIONAL: Well-developed; well-nourished; in no acute distress.  SKIN: Skin exam is warm and dry, no acute rash.  HEAD: Normocephalic; atraumatic.  EYES: PERRL, EOM intact; conjunctiva and sclera clear.  ENT: No nasal discharge; airway clear. Throat clear.  NECK: Supple; non tender.  No lymphadenopathy.  CARD: S1, S2 normal; no murmurs, gallops, or rubs. Regular rate and rhythm.  RESP: No wheezes,  no rales or rhonchi.   ABD: Normal bowel sounds; soft; non-distended; non-tender; no hepatosplenomegaly.  EXT: Normal ROM. No clubbing, cyanosis or edema. (+) b/l thoracic back pain and arm pain.  NEURO: Alert, oriented. Grossly unremarkable. No focal deficits. no facial droop, moves all extremities,  no pronator drift, finger-to-nose wnl, normal gait   PSYCH: Cooperative, appropriate.

## 2019-02-15 NOTE — ED STATDOCS - NS ED ROS FT
Review of Systems  •	CONSTITUTIONAL - no  fever, no diaphoresis, no weight change  •	SKIN - no rash  •	HEMATOLOGIC - no bleeding, no bruising  •	EYES - no eye pain, no blurred vision  •	ENT - no change in hearing, no pain  •	RESPIRATORY - no shortness of breath, no cough  •	CARDIAC - no chest pain, no palpitations  •	GI - no abd pain, no nausea, no vomiting, no diarrhea, no constipation, no bleeding  •	GENITO-URINARY - no discharge, no dysuria; no hematuria,   •	ENDO - no polydypsia, no polyurea, no heat/no cold intolerance  •	MUSCULOSKELETAL - no joint pain, no swelling, no redness (+) arm pain   •	NEUROLOGIC - no weakness, no headache, no anesthesia, no paresthesias  •	PSYCH - no anxiety, non suicidal, non homicidal, no hallucination, no depression

## 2019-02-15 NOTE — ED STATDOCS - OBJECTIVE STATEMENT
47 yo F hx of heart murmur p/w diffuse bilateral arm pain and thoraic back pain, worse with movement, not able to sleep through the night. She has been taking ibu 800 mg PO without relieve. last dose last night.

## 2019-02-15 NOTE — ED STATDOCS - PROGRESS NOTE DETAILS
PA Note: PT evaluated by intake physician. HPI/PE/ROS as noted above. Will follow up plan per intake physician. Pt reports feeling better after medication administration. Pt will d/c with muscle relaxers and lidoderm patch. Pt to follow up with PCP.

## 2019-02-15 NOTE — ED STATDOCS - ATTENDING CONTRIBUTION TO CARE
I, Issa Toro, performed the initial face to face bedside interview with this patient regarding history of present illness, review of symptoms and relevant past medical, social and family history.  I completed an independent physical examination.  I was the initial provider who evaluated this patient. I have signed out the follow up of any pending tests (i.e. labs, radiological studies) to the ACP.  I have communicated the patient’s plan of care and disposition with the ACP.

## 2019-03-09 NOTE — ED ADULT NURSE NOTE - PRIMARY CARE PROVIDER
Problem: Adult Inpatient Plan of Care  Goal: Plan of Care Review  Outcome: Ongoing (interventions implemented as appropriate)  Pt on BIPAP overnight, O2 sats 100%. HR NSR 60-80. SBP maintained >100. Afebrile. UOP >27cc/h per nieves. Pt had CT with contrast of abdomen/pelvis. Accuchecks done Q4, covered with PRN insulin. Plan of care reviewed with pt. VSS at this time. Will continue to monitor. See flowsheet for full assessment details.        Dr Claros

## 2019-04-11 ENCOUNTER — APPOINTMENT (OUTPATIENT)
Dept: GASTROENTEROLOGY | Facility: CLINIC | Age: 49
End: 2019-04-11
Payer: MEDICAID

## 2019-04-11 VITALS
DIASTOLIC BLOOD PRESSURE: 74 MMHG | SYSTOLIC BLOOD PRESSURE: 132 MMHG | BODY MASS INDEX: 25.49 KG/M2 | HEART RATE: 71 BPM | WEIGHT: 135 LBS | HEIGHT: 61 IN | RESPIRATION RATE: 16 BRPM | OXYGEN SATURATION: 98 %

## 2019-04-11 DIAGNOSIS — Z12.11 ENCOUNTER FOR SCREENING FOR MALIGNANT NEOPLASM OF COLON: ICD-10-CM

## 2019-04-11 DIAGNOSIS — Z78.9 OTHER SPECIFIED HEALTH STATUS: ICD-10-CM

## 2019-04-11 DIAGNOSIS — E05.90 THYROTOXICOSIS, UNSPECIFIED W/OUT THYROTOXIC CRISIS OR STORM: ICD-10-CM

## 2019-04-11 DIAGNOSIS — I10 ESSENTIAL (PRIMARY) HYPERTENSION: ICD-10-CM

## 2019-04-11 PROCEDURE — 99204 OFFICE O/P NEW MOD 45 MIN: CPT

## 2019-04-11 RX ORDER — METHIMAZOLE 5 MG/1
5 TABLET ORAL
Refills: 0 | Status: ACTIVE | COMMUNITY

## 2019-04-11 RX ORDER — POLYETHYLENE GLYOCOL 3350, SODIUM CHLORIDE, SODIUM BICARBONATE AND POTASSIUM CHLORIDE 420; 11.2; 5.72; 1.48 G/4L; G/4L; G/4L; G/4L
420 POWDER, FOR SOLUTION NASOGASTRIC; ORAL
Qty: 1 | Refills: 0 | Status: ACTIVE | COMMUNITY
Start: 2019-04-11 | End: 1900-01-01

## 2019-04-11 RX ORDER — BENAZEPRIL HYDROCHLORIDE 5 MG/1
TABLET ORAL
Refills: 0 | Status: ACTIVE | COMMUNITY

## 2019-04-11 NOTE — CONSULT LETTER
[Dear  ___] : Dear  [unfilled], [Consult Letter:] : I had the pleasure of evaluating your patient, [unfilled]. [Please see my note below.] : Please see my note below. [Consult Closing:] : Thank you very much for allowing me to participate in the care of this patient.  If you have any questions, please do not hesitate to contact me. [FreeTextEntry3] : Very truly yours,\par \par ELEAZAR Becerra MD\par [facility]\par Gastroenterology at [place]\par [address], [address]\[place] [address]\par Tel [phone]\par Fax [phone]

## 2019-04-11 NOTE — HISTORY OF PRESENT ILLNESS
[de-identified] : The patient was referred by PCP for evaluation for a screening colonoscopy.  There is no colonoscopy history.  The patient denies any changes in bowel habits, abdominal pain, rectal bleeding, nausea, vomiting, diarrhea, constipation or a family history of colorectal cancer.  The patient reports feeling well and has no complaints.

## 2019-06-19 NOTE — ED ADULT TRIAGE NOTE - WEIGHT IN LBS
Patient returned call to clinic for results. Patient verbalized understanding and has no questions or concerns at this time.   130

## 2019-08-09 ENCOUNTER — APPOINTMENT (OUTPATIENT)
Dept: GASTROENTEROLOGY | Facility: GI CENTER | Age: 49
End: 2019-08-09

## 2019-08-23 ENCOUNTER — EMERGENCY (EMERGENCY)
Facility: HOSPITAL | Age: 49
LOS: 1 days | Discharge: HOME | End: 2019-08-23
Attending: EMERGENCY MEDICINE
Payer: MEDICAID

## 2019-08-23 VITALS
HEART RATE: 70 BPM | OXYGEN SATURATION: 100 % | TEMPERATURE: 98 F | SYSTOLIC BLOOD PRESSURE: 129 MMHG | WEIGHT: 128.09 LBS | RESPIRATION RATE: 18 BRPM | HEIGHT: 62 IN | DIASTOLIC BLOOD PRESSURE: 72 MMHG

## 2019-08-23 LAB
ANION GAP SERPL CALC-SCNC: 12 MMOL/L — SIGNIFICANT CHANGE UP (ref 5–17)
APPEARANCE UR: ABNORMAL
BACTERIA # UR AUTO: ABNORMAL
BASOPHILS # BLD AUTO: 0.02 K/UL — SIGNIFICANT CHANGE UP (ref 0–0.2)
BASOPHILS NFR BLD AUTO: 0.4 % — SIGNIFICANT CHANGE UP (ref 0–2)
BILIRUB UR-MCNC: NEGATIVE — SIGNIFICANT CHANGE UP
BLD GP AB SCN SERPL QL: SIGNIFICANT CHANGE UP
BUN SERPL-MCNC: 11 MG/DL — SIGNIFICANT CHANGE UP (ref 8–20)
CALCIUM SERPL-MCNC: 9.3 MG/DL — SIGNIFICANT CHANGE UP (ref 8.6–10.2)
CHLORIDE SERPL-SCNC: 102 MMOL/L — SIGNIFICANT CHANGE UP (ref 98–107)
CO2 SERPL-SCNC: 25 MMOL/L — SIGNIFICANT CHANGE UP (ref 22–29)
COLOR SPEC: ABNORMAL
COMMENT - URINE: SIGNIFICANT CHANGE UP
CREAT SERPL-MCNC: 0.67 MG/DL — SIGNIFICANT CHANGE UP (ref 0.5–1.3)
DIFF PNL FLD: ABNORMAL
EOSINOPHIL # BLD AUTO: 0.06 K/UL — SIGNIFICANT CHANGE UP (ref 0–0.5)
EOSINOPHIL NFR BLD AUTO: 1.1 % — SIGNIFICANT CHANGE UP (ref 0–6)
EPI CELLS # UR: SIGNIFICANT CHANGE UP
GLUCOSE SERPL-MCNC: 113 MG/DL — SIGNIFICANT CHANGE UP (ref 70–115)
GLUCOSE UR QL: NEGATIVE MG/DL — SIGNIFICANT CHANGE UP
HCG UR QL: NEGATIVE — SIGNIFICANT CHANGE UP
HCT VFR BLD CALC: 36.1 % — SIGNIFICANT CHANGE UP (ref 34.5–45)
HGB BLD-MCNC: 11.6 G/DL — SIGNIFICANT CHANGE UP (ref 11.5–15.5)
IMM GRANULOCYTES NFR BLD AUTO: 0.2 % — SIGNIFICANT CHANGE UP (ref 0–1.5)
KETONES UR-MCNC: ABNORMAL
LEUKOCYTE ESTERASE UR-ACNC: ABNORMAL
LYMPHOCYTES # BLD AUTO: 1.51 K/UL — SIGNIFICANT CHANGE UP (ref 1–3.3)
LYMPHOCYTES # BLD AUTO: 27.1 % — SIGNIFICANT CHANGE UP (ref 13–44)
MCHC RBC-ENTMCNC: 30 PG — SIGNIFICANT CHANGE UP (ref 27–34)
MCHC RBC-ENTMCNC: 32.1 GM/DL — SIGNIFICANT CHANGE UP (ref 32–36)
MCV RBC AUTO: 93.3 FL — SIGNIFICANT CHANGE UP (ref 80–100)
MONOCYTES # BLD AUTO: 0.3 K/UL — SIGNIFICANT CHANGE UP (ref 0–0.9)
MONOCYTES NFR BLD AUTO: 5.4 % — SIGNIFICANT CHANGE UP (ref 2–14)
NEUTROPHILS # BLD AUTO: 3.68 K/UL — SIGNIFICANT CHANGE UP (ref 1.8–7.4)
NEUTROPHILS NFR BLD AUTO: 65.8 % — SIGNIFICANT CHANGE UP (ref 43–77)
NITRITE UR-MCNC: NEGATIVE — SIGNIFICANT CHANGE UP
PH UR: 6 — SIGNIFICANT CHANGE UP (ref 5–8)
PLATELET # BLD AUTO: 274 K/UL — SIGNIFICANT CHANGE UP (ref 150–400)
POTASSIUM SERPL-MCNC: 4 MMOL/L — SIGNIFICANT CHANGE UP (ref 3.5–5.3)
POTASSIUM SERPL-SCNC: 4 MMOL/L — SIGNIFICANT CHANGE UP (ref 3.5–5.3)
PROT UR-MCNC: 30 MG/DL
RBC # BLD: 3.87 M/UL — SIGNIFICANT CHANGE UP (ref 3.8–5.2)
RBC # FLD: 13.3 % — SIGNIFICANT CHANGE UP (ref 10.3–14.5)
RBC CASTS # UR COMP ASSIST: >100 /HPF — SIGNIFICANT CHANGE UP (ref 0–4)
SODIUM SERPL-SCNC: 139 MMOL/L — SIGNIFICANT CHANGE UP (ref 135–145)
SP GR SPEC: 1.01 — SIGNIFICANT CHANGE UP (ref 1.01–1.02)
TSH SERPL-MCNC: 1.24 UIU/ML — SIGNIFICANT CHANGE UP (ref 0.27–4.2)
UROBILINOGEN FLD QL: NEGATIVE MG/DL — SIGNIFICANT CHANGE UP
WBC # BLD: 5.58 K/UL — SIGNIFICANT CHANGE UP (ref 3.8–10.5)
WBC # FLD AUTO: 5.58 K/UL — SIGNIFICANT CHANGE UP (ref 3.8–10.5)
WBC UR QL: SIGNIFICANT CHANGE UP

## 2019-08-23 PROCEDURE — 99284 EMERGENCY DEPT VISIT MOD MDM: CPT

## 2019-08-23 PROCEDURE — 81025 URINE PREGNANCY TEST: CPT

## 2019-08-23 PROCEDURE — 86850 RBC ANTIBODY SCREEN: CPT

## 2019-08-23 PROCEDURE — 86900 BLOOD TYPING SEROLOGIC ABO: CPT

## 2019-08-23 PROCEDURE — 76856 US EXAM PELVIC COMPLETE: CPT | Mod: 26

## 2019-08-23 PROCEDURE — 81001 URINALYSIS AUTO W/SCOPE: CPT

## 2019-08-23 PROCEDURE — 76856 US EXAM PELVIC COMPLETE: CPT

## 2019-08-23 PROCEDURE — 85027 COMPLETE CBC AUTOMATED: CPT

## 2019-08-23 PROCEDURE — 76830 TRANSVAGINAL US NON-OB: CPT

## 2019-08-23 PROCEDURE — 36415 COLL VENOUS BLD VENIPUNCTURE: CPT

## 2019-08-23 PROCEDURE — 80048 BASIC METABOLIC PNL TOTAL CA: CPT

## 2019-08-23 PROCEDURE — 86901 BLOOD TYPING SEROLOGIC RH(D): CPT

## 2019-08-23 PROCEDURE — 84443 ASSAY THYROID STIM HORMONE: CPT

## 2019-08-23 PROCEDURE — 76830 TRANSVAGINAL US NON-OB: CPT | Mod: 26

## 2019-08-23 NOTE — ED ADULT NURSE NOTE - OBJECTIVE STATEMENT
pt reports shes been experiencing vaginal bleeding since may s/p D&C and past 2 months its worsening. pt states shes also been having less energy, feeling dizzy at times.

## 2019-08-23 NOTE — ED STATDOCS - OBJECTIVE STATEMENT
Pt is a 48 y/o F, with PMHx of HTN, DM, heart murmur, hypothyroidism, s/p D&C with hysteroscopy on May 21, 2019 for abnormal cells in the uterus on routine PAP smear, s/p , presenting to the ED with c/o vaginal bleeding. Pt states she has been intermittently bleeding since the procedure May. However, it was very light the first couple of months, but has since become progressively heavier. She reports having about 20 days of varying moderations of bleeding in August, but for the last one week, it has been significantly more heavier. Associated lower abd cramping and intermittent lightheadedness. Denies CP, SOB, fever, N/V, and urinary sxs. Still having menses but they are irregular,   Panda HERNANDEZ

## 2019-08-23 NOTE — ED STATDOCS - NS_ ATTENDINGSCRIBEDETAILS _ED_A_ED_FT
I, Rome Vargas, performed the initial face to face bedside interview with this patient regarding history of present illness, review of symptoms and relevant past medical, social and family history.  I completed an independent physical examination.  I was the provider who initially evaluated this patient.  The history, relevant review of systems, past medical and surgical history, medical decision making, and physical examination was documented by the scribe in my presence and I attest to the accuracy of the documentation. Follow-up on ordered tests (ie labs, radiologic studies) and re-evaluation of the patient's status has been communicated to the ACP.  Disposition of the patient will be based on test outcome and response to ED interventions.

## 2019-08-23 NOTE — ED STATDOCS - PSH
History of hysteroscopy    S/P arthroscopic surgery of right knee    S/P  Section  twice  S/P D&C (status post dilation and curettage)

## 2019-08-23 NOTE — ED STATDOCS - CLINICAL SUMMARY MEDICAL DECISION MAKING FREE TEXT BOX
Menorrhagia, labs, sonogram, and pelvic exam. +/- OBGYN Menorrhagia: labs, sonogram, and pelvic exam. +/- OBGYN

## 2019-08-23 NOTE — ED ADULT TRIAGE NOTE - CHIEF COMPLAINT QUOTE
D&C may 21st, states got her period twice in july and august, states vaginally bleeding now, mild lower abdominal pain

## 2019-08-25 DIAGNOSIS — Z98.890 OTHER SPECIFIED POSTPROCEDURAL STATES: Chronic | ICD-10-CM

## 2019-12-14 NOTE — ED PROVIDER NOTE - CHIEF COMPLAINT
ID band present and verified. Family is in patient room. The patient is a 47y Female complaining of sore throat.

## 2020-01-10 NOTE — ED ADULT NURSE NOTE - ALCOHOL PRE SCREEN (AUDIT - C)
Pt states she had onset of L lower back/flank pain earlier this AM.  She now has LLQ pain with vomiting.  States she has started to have some dysuria, but no hematuria.  Hx of kidney stones \"as a kid\".  Had a normal, formed stool this AM.  + vomiting.   
Statement Selected

## 2020-12-21 PROBLEM — Z12.11 ENCOUNTER FOR SCREENING COLONOSCOPY: Status: RESOLVED | Noted: 2019-04-11 | Resolved: 2020-12-21

## 2021-02-27 NOTE — ED PROVIDER NOTE - NSALCOHOLUSECOMMENT_GEN_ALL_CORE_FT
· S/p IVC filter; was previously on Eliquis however held apparently for potential intervention    Will hold for now pending GI evaluation in the event intervention is required uses socially

## 2021-05-03 NOTE — ED PROVIDER NOTE - CROS ED RESP ALL NEG
negative... Slit Excision Additional Text (Leave Blank If You Do Not Want): A linear line was drawn on the skin overlying the lesion. An incision was made slowly until the lesion was visualized.  Once visualized, the lesion was removed with blunt dissection.

## 2021-07-01 NOTE — ED PROVIDER NOTE - ENMT, MLM
Airway patent, Nasal mucosa clear. Mouth with normal mucosa. Throat has no vesicles, no oropharyngeal exudates and uvula is midline. Minoxidil Counseling: Minoxidil is a topical medication which can increase blood flow where it is applied. It is uncertain how this medication increases hair growth. Side effects are uncommon and include stinging and allergic reactions.

## 2021-08-25 NOTE — ED ADULT NURSE NOTE - NS ED NURSE LEVEL OF CONSCIOUSNESS ORIENTATION
Constitutional: (-) fever  Eyes/ENT: (-) blurry vision, (-) epistaxis  Cardiovascular: (-) chest pain, (-) syncope  Respiratory: (-) cough, (-) shortness of breath  Gastrointestinal: (-) vomiting, (-) diarrhea  Musculoskeletal: (-) neck pain, (-) back pain, (+) joint pain  Integumentary: (-) rash, (-) edema  Neurological: (-) headache, (-) altered mental status  Psychiatric: (-) hallucinations (+) HI (-) SI  Allergic/Immunologic: (-) pruritus
Oriented - self; Oriented - place; Oriented - time

## 2021-12-08 NOTE — ED ADULT TRIAGE NOTE - TEMPERATURE IN FAHRENHEIT (DEGREES F)
Body Location Override (Optional - Billing Will Still Be Based On Selected Body Map Location If Applicable): right upper cutaneous lip Detail Level: Detailed Depth Of Biopsy: dermis Was A Bandage Applied: Yes Size Of Lesion In Cm: 0.5 X Size Of Lesion In Cm: 0 Biopsy Type: H and E Biopsy Method: Dermablade Anesthesia Type: 1% lidocaine with epinephrine Hemostasis: Electrocautery Wound Care: Petrolatum Dressing: bandage 98.1 Destruction After The Procedure: No Type Of Destruction Used: Curettage Curettage Text: The wound bed was treated with curettage after the biopsy was performed. Cryotherapy Text: The wound bed was treated with cryotherapy after the biopsy was performed. Electrodesiccation Text: The wound bed was treated with electrodesiccation after the biopsy was performed. Electrodesiccation And Curettage Text: The wound bed was treated with electrodesiccation and curettage after the biopsy was performed. Silver Nitrate Text: The wound bed was treated with silver nitrate after the biopsy was performed. Lab: [id number] Lab Facility: [id number] Consent: Written consent was obtained and risks were reviewed including but not limited to scarring, infection, bleeding, scabbing, incomplete removal, nerve damage and allergy to anesthesia. Post-Care Instructions: I reviewed with the patient in detail post-care instructions. Patient is to keep the biopsy site covered with a bland ointment such as plain vaseline or Aquaphor and a bandage until healed. The bandage should be changed once daily after bathing and more often as needed. \\n Call the office with any concerns about healing. Notification Instructions: Patient will be notified of biopsy results. However, patient instructed to call the office if not contacted within 2 weeks. Billing Type: Third-Party Bill Information: Selecting Yes will display possible errors in your note based on the variables you have selected. This validation is only offered as a suggestion for you. PLEASE NOTE THAT THE VALIDATION TEXT WILL BE REMOVED WHEN YOU FINALIZE YOUR NOTE. IF YOU WANT TO FAX A PRELIMINARY NOTE YOU WILL NEED TO TOGGLE THIS TO 'NO' IF YOU DO NOT WANT IT IN YOUR FAXED NOTE.

## 2022-01-27 NOTE — ED ADULT NURSE NOTE - NSIMPLEMENTINTERV_GEN_ALL_ED
5klm OT note:    Patient was not available for the therapy session at this time.  Reason not seen: Nursing requests holding patient due to medical status (awaiting results of testing/family meetings re:hospice) (01/27/22 7846).     .   Implemented All Universal Safety Interventions:  [place] to call system. Call bell, personal items and telephone within reach. Instruct patient to call for assistance. Room bathroom lighting operational. Non-slip footwear when patient is off stretcher. Physically safe environment: no spills, clutter or unnecessary equipment. Stretcher in lowest position, wheels locked, appropriate side rails in place.

## 2022-06-01 NOTE — ED STATDOCS - DATE/TIME 2
Medication:   Requested Prescriptions     Pending Prescriptions Disp Refills    traZODone (DESYREL) 50 MG tablet 95 tablet 3     Sig: Take 1 tablet by mouth 2 times daily    pregabalin (LYRICA) 75 MG capsule 180 capsule 1     Sig: Take 1 capsule by mouth 2 times daily for 180 days. Last Filled:  10/19/21,02/17/22    Patient Phone Number: [phone] (home)     Last appt: 3/8/2022 Return if symptoms worsen or fail to improve. Next appt: Visit date not found    Last OARRS: No flowsheet data found.
15-Feb-2019 12:30

## 2022-11-19 NOTE — ED ADULT NURSE NOTE - NS ED NURSE RECORD ANOTHER VITAL SIGN
Chief Complaint   Patient presents with    Abdominal Pain     Patient reports LLQ abdominal pain since this morning. Patient reports diagnosed with kidney stones 11/9. Patient also diagnosed with hernia on 11/10. Pain rated 10/10       
Yes

## 2024-11-21 NOTE — ED ADULT NURSE NOTE - NS ED NURSE IV DC DT
Occupational Therapy    Visit Type: treatment  Co-treat with: Physical therapist (safe progression of functional mobility and ADLS)  SUBJECTIVE    \"Get the f** out of my face. Go to hell. NO. I am not doing that. I don't want to \"     Patient verbally abusive throughout session. Unwilling to participate in skilled therapy session despite encouragement and education. Patient became aggressive swatting towards writer and tearful toward the end of session.   Patient / Family Goal: maximize function    OBJECTIVE      Patient Activity Tolerance: 1 to 2 activity to rest     Sitting Balance  (MARY = base of support)  Static      - Trial 1 details: stand by assist, with back unsupported and with double UE support    Tolerated sitting EOB ~8 minutes unsupported      Bed Mobility  - Repositioning in bed: total assist - non-dependent  - Supine to sit: total assist - non-dependent (maxA x2)  - Sit to supine: total assist - non-dependent (maxA x2)  Transfers    Once seated EOB patient refusing to attempt stand trial with argentina montoya despite education. Patient became aggressive trying to swing at writer. Requesting to return to supine       Functional Ambulation  - Assistance: not attempted due to not medically appropriate or safe  Activities of Daily Living (ADLs)  Lower Body Dressing:   - Footwear:       - Assistance: total assist - non-dependent       - Position: supine/bed       - Type: socks  - Assist needed for: set up, verbal cueing, increased time to complete, don/doff left sock and supervision/safety Interventions    Treatment provided: ADL training, activity tolerance, balance retraining, bed mobility training, compensatory techniques, functional ambulation, gait training, positioning, transfer training, safety training, body mechanics, cognitive training and energy conservation  Skilled input: verbal instruction/cues and tactile instruction/cues  Verbal Consent: Writer verbally educated and received verbal consent for  hand placement, positioning of patient, and techniques to be performed today from patient for hand placement and palpation for techniques and therapist position for techniques as described above and how they are pertinent to the patient's plan of care.       Education:   - Present and ready to learn: patient  Education provided during session:  - Results of above outlined education: Verbalizes understanding, Demonstrates understanding and Needs reinforcement    ASSESSMENT   Progress: slow progress  Interfering components: cognitive deficits, decreased activity tolerance, mood/coping, decreased insight into deficit, lines/equipment, minimal participation and multiple refusals    Discharge needs based on today's assessment:  - Current level of function: slightly below baseline level of function  - Therapy needs at discharge: therapy 5 or more times per week  - Activities of daily living (ADLs) requiring support at discharge: bathing, bed mobility, transfers, ambulation, toileting, grooming and dressing  - Instrumental activities of daily living (IADLs) requiring support at discharge: emergency responses, health/medication management, home management, meal preparation and shopping  - Impairments that require further therapy intervention: pain, ROM, strength, activity tolerance, cognition, safety awareness, balance and executive functioning  AM-PAC  - Prior Level of Function: Needs a little help ([facility] 12-21)       Key: MOD A=moderate assistance, IND/MOD I=independent/modified independent  - Generalized Current Level of Function     - Current Self-Cares: 8       Scoring Key= >21 Modified Independent; 20-21 Supervision; 18-19 Minimal assist; 13-18 Moderate assist; 9-12 Max assist; <9 Total assist      PLAN (while hospitalized)  Suggestions for next session as indicated: co-tx with PT to progress functional transfers with argentina montoya, seated cares, BUE AROM/exercises- if patient continue to refuse to participate , consider  DC from services     OT Frequency: 3-5 x per week      PT/OT Mobility Equipment for Discharge: continue to assess  PT/OT ADL Equipment for Discharge: owns a reacher; assess need for AE if more participatory  Agreement to plan and goals: patient agrees with goals and treatment plan    GOALS  Review Date: 11/25/2024  Long Term Goals: (to be met by time of discharge from hospital)  Grooming: Patient will complete grooming tasks moderate assist.  Status: progressing/ongoing  Upper body dressing: Patient will complete upper body dressing moderate assist.  Status: progressing/ongoing  Lower body dressing: Patient will complete lower body dressing moderate assist.  Status: progressing/ongoing  Toileting: Patient will complete toileting moderate assist.  Status: progressing/ongoing  Bathing: Patient will complete bathing Equipment: sitting in wheelchair., moderate assist   Status: progressing/ongoingToilet transfer: Patient will complete toilet transfer with moderate assist.   Status: progressing/ongoing  Home setting transfer: Patient will complete home setting transfers with moderate assist.   Status: progressing/ongoing    Documented in the chart in the following areas: Assessment/Plan.    Patient at End of Session:   Location: in bed  Safety measures: call light within reach and alarm system in place/re-engaged  Handoff to: nurse    Therapy procedure time and total treatment time can be found documented on the Time Entry flowsheet   24-Jan-2017 02:03

## 2025-06-03 NOTE — ED ADULT TRIAGE NOTE - MEANS OF ARRIVAL
Comment: Not well controlled on ciclopirox shampoo and Clobetasol ointment. Reassessed treatment regimen. Will have pt initiate ketoconazole shampoo in conjunction with the ciclopirox shampoo and Zorvye topical foam QD, but will continue use of the clobetasol foam BID for up to 2 weeks at a time. Pt complained of hair loss and was reassured that regrowth would likely be seen once inflammation of the scalp has calmed down. F/u in 8-10 weeks. Render Risk Assessment In Note?: no Detail Level: Simple ambulatory Comment: Newly measured mole on exam. Will monitor. Recommended annual FBSE or sooner if concerns. Comment: Mild flaring on exam. Will have pt initiate nystatin cream and hydrocortisone as treatment. Reviewed SEs, expectations, and proper use. Comment: Lesion of pts concern. Briefly discussed nature and etiology. Reassured benign. Discussed only form of treatment being pulse dye laser and reviewed expectations/risks. Recommended holding off treatment until the fall or wintertime if desired to limit sun exposure. Comment: Complained of hyperpigmentation. Discussed laser as the best form of treatment.